# Patient Record
(demographics unavailable — no encounter records)

---

## 2024-10-09 NOTE — HISTORY OF PRESENT ILLNESS
[Lower back] : lower back [Right Arm] : right arm [Gradual] : gradual [8] : 8 [2] : 2 [Burning] : burning [Dull/Aching] : dull/aching [Radiating] : radiating [Sharp] : sharp [Shooting] : shooting [Stabbing] : stabbing [Throbbing] : throbbing [Constant] : constant [Household chores] : household chores [Work] : work [Sleep] : sleep [Rest] : rest [Meds] : meds [Heat] : heat [Sitting] : sitting [Standing] : standing [Walking] : walking [Bending forward] : bending forward [Retired] : Work status: retired [Steroid] : Steroid [Home] : at home, [unfilled] , at the time of the visit. [Medical Office: ([facility])___] : at the medical office located in  [Verbal consent obtained from patient] : the patient, [unfilled] [FreeTextEntry1] : LAURI STEPHENSON IS FOLLOWING UP FOR PAIN MED REFILL, PT ADL'S INCREASE WITH MEDICATION PT IS MORE ACTIVE IN GENERAL AND HAS IMRPOVED QUALITY OF LIFE. PT IS ABLE TO COMPLETE ADL  LAST UDS:7/02/204  PAIN LEVEL:8-9/10 [] : no [FreeTextEntry6] : WHOLE RIGHT SIDE  [FreeTextEntry7] : DOWN RIGHT LEG , RT HIP / LEFT HIP  [de-identified] : AS OF 2024-01-03 CONFIRMED PT IS CONTINUING WITH HOME EXERCISES , WALKING DAILY 5-7 WEEK AS TOLERATED

## 2024-11-11 NOTE — HISTORY OF PRESENT ILLNESS
[Lower back] : lower back [Right Arm] : right arm [Gradual] : gradual [Burning] : burning [Dull/Aching] : dull/aching [Radiating] : radiating [Sharp] : sharp [Shooting] : shooting [Stabbing] : stabbing [Throbbing] : throbbing [Constant] : constant [Household chores] : household chores [Work] : work [Sleep] : sleep [Rest] : rest [Meds] : meds [Heat] : heat [Sitting] : sitting [Standing] : standing [Walking] : walking [Bending forward] : bending forward [Retired] : Work status: retired [Steroid] : Steroid [Home] : at home, [unfilled] , at the time of the visit. [Medical Office: ([facility])___] : at the medical office located in  [Verbal consent obtained from patient] : the patient, [unfilled] [FreeTextEntry1] : LAURI STEPHENSON IS FOLLOWING UP FOR PAIN MED REFILL, PT ADL'S INCREASE WITH MEDICATION PT IS MORE ACTIVE IN GENERAL AND HAS IMRPOVED QUALITY OF LIFE. PT IS ABLE TO COMPLETE ADL  LAST UDS:7/02/204  PAIN LEVEL:8-9/10 [] : no [FreeTextEntry6] : WHOLE RIGHT SIDE  [FreeTextEntry7] : DOWN RIGHT LEG , RT HIP / LEFT HIP  [de-identified] : AS OF 2024-01-03 CONFIRMED PT IS CONTINUING WITH HOME EXERCISES , WALKING DAILY 5-7 WEEK AS TOLERATED

## 2024-12-09 NOTE — DISCUSSION/SUMMARY
[Medication Risks Reviewed] : Medication risks reviewed [de-identified] : Prescriptions renewed. [place]obtained on chart [place]  reviewed and appropriate. SOAPP-R completed on chart. The patient's medications are documented to the best of their ability. Quality of life and functional ability improved on medications. The patient is showing no aberrant behavior or evidence of diversion. The patient was advised not to use narcotic medication while operating an automobile or heavy machinery due to potential sedation or dizziness. The patient was educated to the risks associated with potential opioid dependence and addiction. Urine toxicology screens as per office protocol. Use of multimodal analgesia used prn. Follow up one month.

## 2024-12-09 NOTE — HISTORY OF PRESENT ILLNESS
[Lower back] : lower back [Right Arm] : right arm [Gradual] : gradual [7] : 7 [4] : 4 [Burning] : burning [Dull/Aching] : dull/aching [Radiating] : radiating [Sharp] : sharp [Shooting] : shooting [Stabbing] : stabbing [Throbbing] : throbbing [Constant] : constant [Household chores] : household chores [Work] : work [Sleep] : sleep [Rest] : rest [Meds] : meds [Heat] : heat [Sitting] : sitting [Standing] : standing [Walking] : walking [Bending forward] : bending forward [Retired] : Work status: retired [Steroid] : Steroid [FreeTextEntry1] : States chronic pain stable. Pain meds effective. prn.  Maintains a functional ADL. [] : no [FreeTextEntry6] : WHOLE RIGHT SIDE  [FreeTextEntry7] : DOWN RIGHT LEG , RT HIP / LEFT HIP  [de-identified] : AS OF 2024-01-03 CONFIRMED PT IS CONTINUING WITH HOME EXERCISES , WALKING DAILY 5-7 WEEK AS TOLERATED

## 2025-01-14 NOTE — HISTORY OF PRESENT ILLNESS
[Lower back] : lower back [Right Arm] : right arm [Gradual] : gradual [9] : 9 [6] : 6 [Burning] : burning [Dull/Aching] : dull/aching [Radiating] : radiating [Sharp] : sharp [Shooting] : shooting [Stabbing] : stabbing [Throbbing] : throbbing [Constant] : constant [Household chores] : household chores [Work] : work [Sleep] : sleep [Rest] : rest [Meds] : meds [Heat] : heat [Sitting] : sitting [Standing] : standing [Walking] : walking [Bending forward] : bending forward [Retired] : Work status: retired [Steroid] : Steroid [FreeTextEntry1] : Back pain varies. Pain meds effective prn.  Maintains a functional ADL. [] : no [FreeTextEntry6] : WHOLE RIGHT SIDE  [FreeTextEntry7] : DOWN RIGHT LEG , RT HIP / LEFT HIP  [de-identified] : AS OF 2024-01-03 CONFIRMED PT IS CONTINUING WITH HOME EXERCISES , WALKING DAILY 5-7 WEEK AS TOLERATED

## 2025-01-14 NOTE — DISCUSSION/SUMMARY
[Medication Risks Reviewed] : Medication risks reviewed [de-identified] : Prescriptions renewed. [place]obtained on chart [place]  reviewed and appropriate. SOAPP-R completed on chart. The patient's medications are documented to the best of their ability. Quality of life and functional ability improved on medications. The patient is showing no aberrant behavior or evidence of diversion. The patient was advised not to use narcotic medication while operating an automobile or heavy machinery due to potential sedation or dizziness. The patient was educated to the risks associated with potential opioid dependence and addiction. Urine toxicology screens as per office protocol. Use of multimodal analgesia used prn. Follow up one month.

## 2025-02-10 NOTE — REASON FOR VISIT
[Home] : at home, [unfilled] , at the time of the visit. [Medical Office: ([facility])___] : at the medical office located in  [Telehealth (audio & video)] : This visit was provided via telehealth using real-time 2-way audio visual technology. [Follow-Up Visit] : a follow-up pain management visit

## 2025-02-10 NOTE — DISCUSSION/SUMMARY
[Medication Risks Reviewed] : Medication risks reviewed [de-identified] : Prescriptions renewed. [place]obtained on chart [place]  reviewed and appropriate. SOAPP-R completed on chart. The patient's medications are documented to the best of their ability. Quality of life and functional ability improved on medications. The patient is showing no aberrant behavior or evidence of diversion. The patient was advised not to use narcotic medication while operating an automobile or heavy machinery due to potential sedation or dizziness. The patient was educated to the risks associated with potential opioid dependence and addiction. Urine toxicology screens as per office protocol. Use of multimodal analgesia used prn. Follow up one month.

## 2025-02-10 NOTE — HISTORY OF PRESENT ILLNESS
[Lower back] : lower back [Right Arm] : right arm [Gradual] : gradual [9] : 9 [6] : 6 [Burning] : burning [Dull/Aching] : dull/aching [Radiating] : radiating [Sharp] : sharp [Shooting] : shooting [Stabbing] : stabbing [Throbbing] : throbbing [Constant] : constant [Household chores] : household chores [Work] : work [Sleep] : sleep [Rest] : rest [Meds] : meds [Heat] : heat [Sitting] : sitting [Standing] : standing [Walking] : walking [Bending forward] : bending forward [Retired] : Work status: retired [Steroid] : Steroid [FreeTextEntry1] : Chronic back pain varies. Considering an. [facility] in April as vacation planned. Aware she. would require Plav[employer] letter.  [] : no [FreeTextEntry6] : WHOLE RIGHT SIDE  [FreeTextEntry7] : DOWN RIGHT LEG , RT HIP / LEFT HIP  [de-identified] : AS OF 2024-01-03 CONFIRMED PT IS CONTINUING WITH HOME EXERCISES , WALKING DAILY 5-7 WEEK AS TOLERATED

## 2025-03-10 NOTE — DISCUSSION/SUMMARY
[Medication Risks Reviewed] : Medication risks reviewed [de-identified] : Prescriptions renewed. [place]obtained on chart [place]  reviewed and appropriate. SOAPP-R completed on chart. The patient's medications are documented to the best of their ability. Quality of life and functional ability improved on medications. The patient is showing no aberrant behavior or evidence of diversion. The patient was advised not to use narcotic medication while operating an automobile or heavy machinery due to potential sedation or dizziness. The patient was educated to the risks associated with potential opioid dependence and addiction. Urine toxicology screens as per office protocol. Use of multimodal analgesia used prn. Follow up one month.

## 2025-03-10 NOTE — REASON FOR VISIT
[Home] : at home, [unfilled] , at the time of the visit. [Medical Office: ([facility])___] : at the medical office located in  [Telehealth (audio & video)] : This visit was provided via telehealth using real-time 2-way audio visual technology. [Follow-Up Visit] : a follow-up pain management visit

## 2025-03-10 NOTE — HISTORY OF PRESENT ILLNESS
[Lower back] : lower back [Right Arm] : right arm [Gradual] : gradual [7] : 7 [5] : 5 [Burning] : burning [Dull/Aching] : dull/aching [Radiating] : radiating [Sharp] : sharp [Shooting] : shooting [Stabbing] : stabbing [Throbbing] : throbbing [Constant] : constant [Household chores] : household chores [Work] : work [Sleep] : sleep [Rest] : rest [Meds] : meds [Heat] : heat [Sitting] : sitting [Standing] : standing [Walking] : walking [Bending forward] : bending forward [Retired] : Work status: retired [Steroid] : Steroid [FreeTextEntry1] : Chronic pain stable.  Maintains a functional ADL. Pain meds effective prn.  [] : no [FreeTextEntry6] : WHOLE RIGHT SIDE  [FreeTextEntry7] : DOWN RIGHT LEG , RT HIP / LEFT HIP  [de-identified] : AS OF 2024-01-03 CONFIRMED PT IS CONTINUING WITH HOME EXERCISES , WALKING DAILY 5-7 WEEK AS TOLERATED

## 2025-04-07 NOTE — HISTORY OF PRESENT ILLNESS
[Lower back] : lower back [Right Arm] : right arm [Gradual] : gradual [7] : 7 [5] : 5 [Burning] : burning [Dull/Aching] : dull/aching [Radiating] : radiating [Sharp] : sharp [Shooting] : shooting [Stabbing] : stabbing [Throbbing] : throbbing [Constant] : constant [Household chores] : household chores [Work] : work [Sleep] : sleep [Rest] : rest [Meds] : meds [Heat] : heat [Sitting] : sitting [Standing] : standing [Walking] : walking [Bending forward] : bending forward [Retired] : Work status: retired [Steroid] : Steroid [FreeTextEntry1] : Chronic low back pain varies. Notes weather changes exacerbate her pain. Pain meds helpful. ADL limited attimes.  [] : no [FreeTextEntry6] : WHOLE RIGHT SIDE  [FreeTextEntry7] : DOWN RIGHT LEG , RT HIP / LEFT HIP  [de-identified] : AS OF 2024-01-03 CONFIRMED PT IS CONTINUING WITH HOME EXERCISES , WALKING DAILY 5-7 WEEK AS TOLERATED

## 2025-04-07 NOTE — REASON FOR VISIT
[Home] : at home, [unfilled] , at the time of the visit. [Medical Office: ([facility])___] : at the medical office located in  [Telehealth (audio & video)] : This visit was provided via telehealth using real-time 2-way audio visual technology. [Follow-Up Visit] : a follow-up pain management visit

## 2025-04-07 NOTE — DISCUSSION/SUMMARY
[Medication Risks Reviewed] : Medication risks reviewed [de-identified] : Prescriptions renewed. [place]obtained on chart [place]  reviewed and appropriate. SOAPP-R completed on chart. The patient's medications are documented to the best of their ability. Quality of life and functional ability improved on medications. The patient is showing no aberrant behavior or evidence of diversion. The patient was advised not to use narcotic medication while operating an automobile or heavy machinery due to potential sedation or dizziness. The patient was educated to the risks associated with potential opioid dependence and addiction. Urine toxicology screens as per office protocol. Use of multimodal analgesia used prn. Follow up one month.

## 2025-05-09 NOTE — DISCUSSION/SUMMARY
[Medication Risks Reviewed] : Medication risks reviewed [de-identified] : Prescriptions renewed. [place]obtained on chart [place]  reviewed and appropriate. SOAPP-R completed on chart. The patient's medications are documented to the best of their ability. Quality of life and functional ability improved on medications. The patient is showing no aberrant behavior or evidence of diversion. The patient was advised not to use narcotic medication while operating an automobile or heavy machinery due to potential sedation or dizziness. The patient was educated to the risks associated with potential opioid dependence and addiction. Urine toxicology screens as per office protocol. Use of multimodal analgesia used prn. Follow up one month.

## 2025-05-09 NOTE — HISTORY OF PRESENT ILLNESS
[Lower back] : lower back [9] : 9 [3] : 3 [Burning] : burning [Dull/Aching] : dull/aching [Shooting] : shooting [Throbbing] : throbbing [Constant] : constant [Meds] : meds [Walking] : walking [Bending forward] : bending forward [] : yes [Retired] : Work status: retired [FreeTextEntry1] : States chronic back pain stable. Weather changes affect her pain and ADL. Pain. meds effective prn.

## 2025-05-09 NOTE — REASON FOR VISIT
[Follow-Up Visit] : a follow-up pain management visit [Home] : at home, [unfilled] , at the time of the visit. [Medical Office: ([facility])___] : at the medical office located in  [Telehealth (audio & video)] : This visit was provided via telehealth using real-time 2-way audio visual technology.

## 2025-06-10 NOTE — HISTORY OF PRESENT ILLNESS
[Lower back] : lower back [7] : 7 [Burning] : burning [4] : 4 [Shooting] : shooting [Dull/Aching] : dull/aching [Throbbing] : throbbing [Constant] : constant [Meds] : meds [Walking] : walking [Bending forward] : bending forward [] : yes [Retired] : Work status: retired [FreeTextEntry1] : States low back pain varies. States affects her right lower back.  Dealing with chronic Vertigo. Pain meds effective prn.

## 2025-06-10 NOTE — ASSESSMENT
[FreeTextEntry1] : Alert and oriented  X 3. Since last visit there are no changes to the patient's physical status.

## 2025-06-10 NOTE — DISCUSSION/SUMMARY
[Medication Risks Reviewed] : Medication risks reviewed [de-identified] : Prescriptions renewed. [place]obtained on chart [place]  reviewed and appropriate. SOAPP-R completed on chart. The patient's medications are documented to the best of their ability. Quality of life and functional ability improved on medications. The patient is showing no aberrant behavior or evidence of diversion. The patient was advised not to use narcotic medication while operating an automobile or heavy machinery due to potential sedation or dizziness. The patient was educated to the risks associated with potential opioid dependence and addiction. Urine toxicology screens as per office protocol. Use of multimodal analgesia used prn. Follow up one month.

## 2025-07-07 NOTE — DISCUSSION/SUMMARY
[Medication Risks Reviewed] : Medication risks reviewed [de-identified] : Prescriptions renewed. Op[place]id [place]/obtained on chart [place]  reviewed and appropriate. SOAPP-R completed on chart. The patient's medications are documented to the best of their ability. Quality of life and functional ability improved on medications. The patient is showing no aberrant behavior or evidence of diversion. The patient was advised not to use narcotic medication while operating an automobile or heavy machinery due to potential sedation or dizziness. The patient was educated to the risks associated with potential opioid dependence and addiction. Urine toxicology screens as per office protocol. Use of multimodal analgesia used prn. Follow up one month.  This clinical note is not designed to be interpreted by the patient and we do not recommend reading it unless you have medical training. These notes may contain candid and (unintentionally) offensive descriptions, which are sometimes required for accurate documentation. In addition, patients cannot request to have the documentation modified (unless there is an error of fact). If you would like more information about your healthcare, please obtain it directly from myself or my staff/ colleagues- never solely from the medical notes. Thank you for your understanding and cooperation.

## 2025-07-07 NOTE — REASON FOR VISIT
[Home] : at home, [unfilled] , at the time of the visit. [Medical Office: ([facility])___] : at the medical office located in  [Telehealth (audio & video)] : This visit was provided via telehealth using real-time 2-way audio visual technology. [Follow-Up Visit] : a follow-up pain management visit

## 2025-07-07 NOTE — HISTORY OF PRESENT ILLNESS
[Lower back] : lower back [7] : 7 [6] : 6 [Burning] : burning [Dull/Aching] : dull/aching [Shooting] : shooting [Throbbing] : throbbing [Constant] : constant [Meds] : meds [Walking] : walking [Bending forward] : bending forward [] : yes [Retired] : Work status: retired [FreeTextEntry1] : Chronic low back pain stable.  Maintains a functional ADL. Pain meds effective prn.